# Patient Record
Sex: MALE | Race: WHITE | NOT HISPANIC OR LATINO | Employment: FULL TIME | URBAN - METROPOLITAN AREA
[De-identification: names, ages, dates, MRNs, and addresses within clinical notes are randomized per-mention and may not be internally consistent; named-entity substitution may affect disease eponyms.]

---

## 2023-05-25 ENCOUNTER — OFFICE VISIT (OUTPATIENT)
Dept: SURGERY | Facility: CLINIC | Age: 43
End: 2023-05-25

## 2023-05-25 ENCOUNTER — APPOINTMENT (OUTPATIENT)
Dept: LAB | Facility: CLINIC | Age: 43
End: 2023-05-25

## 2023-05-25 VITALS
TEMPERATURE: 97.8 F | BODY MASS INDEX: 38.28 KG/M2 | OXYGEN SATURATION: 99 % | SYSTOLIC BLOOD PRESSURE: 155 MMHG | WEIGHT: 267.4 LBS | DIASTOLIC BLOOD PRESSURE: 97 MMHG | RESPIRATION RATE: 12 BRPM | HEART RATE: 77 BPM | HEIGHT: 70 IN

## 2023-05-25 DIAGNOSIS — K42.9 UMBILICAL HERNIA WITHOUT OBSTRUCTION AND WITHOUT GANGRENE: ICD-10-CM

## 2023-05-25 DIAGNOSIS — K42.9 UMBILICAL HERNIA WITHOUT OBSTRUCTION OR GANGRENE: Primary | ICD-10-CM

## 2023-05-25 LAB
ALBUMIN SERPL BCP-MCNC: 3.8 G/DL (ref 3.5–5)
ALP SERPL-CCNC: 75 U/L (ref 46–116)
ALT SERPL W P-5'-P-CCNC: 56 U/L (ref 12–78)
ANION GAP SERPL CALCULATED.3IONS-SCNC: 2 MMOL/L (ref 4–13)
AST SERPL W P-5'-P-CCNC: 36 U/L (ref 5–45)
BASOPHILS # BLD AUTO: 0.08 THOUSANDS/ÂΜL (ref 0–0.1)
BASOPHILS NFR BLD AUTO: 1 % (ref 0–1)
BILIRUB SERPL-MCNC: 0.71 MG/DL (ref 0.2–1)
BUN SERPL-MCNC: 12 MG/DL (ref 5–25)
CALCIUM SERPL-MCNC: 9.2 MG/DL (ref 8.3–10.1)
CHLORIDE SERPL-SCNC: 105 MMOL/L (ref 96–108)
CO2 SERPL-SCNC: 26 MMOL/L (ref 21–32)
CREAT SERPL-MCNC: 1.12 MG/DL (ref 0.6–1.3)
EOSINOPHIL # BLD AUTO: 0.33 THOUSAND/ÂΜL (ref 0–0.61)
EOSINOPHIL NFR BLD AUTO: 4 % (ref 0–6)
ERYTHROCYTE [DISTWIDTH] IN BLOOD BY AUTOMATED COUNT: 11.6 % (ref 11.6–15.1)
GFR SERPL CREATININE-BSD FRML MDRD: 81 ML/MIN/1.73SQ M
GLUCOSE P FAST SERPL-MCNC: 102 MG/DL (ref 65–99)
HCT VFR BLD AUTO: 46.4 % (ref 36.5–49.3)
HGB BLD-MCNC: 15.2 G/DL (ref 12–17)
IMM GRANULOCYTES # BLD AUTO: 0.02 THOUSAND/UL (ref 0–0.2)
IMM GRANULOCYTES NFR BLD AUTO: 0 % (ref 0–2)
LYMPHOCYTES # BLD AUTO: 1.71 THOUSANDS/ÂΜL (ref 0.6–4.47)
LYMPHOCYTES NFR BLD AUTO: 19 % (ref 14–44)
MCH RBC QN AUTO: 31 PG (ref 26.8–34.3)
MCHC RBC AUTO-ENTMCNC: 32.8 G/DL (ref 31.4–37.4)
MCV RBC AUTO: 95 FL (ref 82–98)
MONOCYTES # BLD AUTO: 0.71 THOUSAND/ÂΜL (ref 0.17–1.22)
MONOCYTES NFR BLD AUTO: 8 % (ref 4–12)
NEUTROPHILS # BLD AUTO: 6.03 THOUSANDS/ÂΜL (ref 1.85–7.62)
NEUTS SEG NFR BLD AUTO: 68 % (ref 43–75)
NRBC BLD AUTO-RTO: 0 /100 WBCS
PLATELET # BLD AUTO: 279 THOUSANDS/UL (ref 149–390)
PMV BLD AUTO: 10.2 FL (ref 8.9–12.7)
POTASSIUM SERPL-SCNC: 3.9 MMOL/L (ref 3.5–5.3)
PROT SERPL-MCNC: 7.9 G/DL (ref 6.4–8.4)
RBC # BLD AUTO: 4.91 MILLION/UL (ref 3.88–5.62)
SODIUM SERPL-SCNC: 133 MMOL/L (ref 135–147)
WBC # BLD AUTO: 8.88 THOUSAND/UL (ref 4.31–10.16)

## 2023-05-25 NOTE — PROGRESS NOTES
Office Visit - General Surgery  Charlie Doherty MRN: [id number]  Encounter: [id number]    Assessment and Plan  Problem List Items Addressed This Visit        Other    Umbilical hernia without obstruction or gangrene - Primary   Patient with umbilical hernia which becomes intermittently incarcerated  Risk-benefit alternatives to surgery discussed at length with patient as well as spouse  Defect relatively small 1 to 2 cm in size  We will plan for operative intervention and repair  Plus minus mesh depending on final size of defect  Patient in agreement for mesh placement at this time  Patient also at high risk for recurrence given current smoking of cigars as well as obesity  Patient understands and accepts this as well  Chief Complaint:  Charlie Doherty is a 38 y o  male who presents for Hernia (Consult right inguinal hernia )    Subjective  Patient with known history of umbilical hernia  Has known for several years  Recently became obstructed and was reduced at the [facility]   Denies nausea vomiting fevers chills  Denies signs symptoms of obstruction including nausea and vomiting  Denies significant abdominal pain at this time notes only significant fullness when lifting  It is currently easily reducible per the patient  Patient without significant past medical history  Has history of sleep apnea  History reviewed  No pertinent past medical history  History reviewed  No pertinent surgical history  No prior surgeries    History reviewed  No pertinent family history  No family history of blood clots pulmonary embolism strokes    Medications  No current outpatient medications on file prior to visit  No current facility-administered medications on file prior to visit  Allergies  No Known Allergies    Review of Systems  All 11 systems reviewed and otherwise negative listed above in the HPI    Objective  Vitals:    05/25/23 0854   BP: 155/97   Pulse: 77   Resp: 12   Temp: 97 8 °F (36 6 °C)   SpO2: 99%       Physical Exam   General  NAD well appearing  HEENT  Pupils round equal to light accommodation no scleral icterus noted  Heart regular rate and rhythm no murmurs gallops rubs appreciated  Lungs clear to auscultation chest wall symmetric  Neuro GCS 15 alert oriented x3 full range of motion in all extremities  ABD: Obese umbilical hernia noted reducible  1 to 2 cm defect  Superior aspect of umbilicus  No guarding or rebound noted  No cellulitis     MSK/Skin:  No rashes erythema noted on scan

## 2023-06-06 ENCOUNTER — ANESTHESIA EVENT (OUTPATIENT)
Dept: PERIOP | Facility: HOSPITAL | Age: 43
End: 2023-06-06
Payer: OTHER MISCELLANEOUS

## 2023-06-07 ENCOUNTER — ANESTHESIA (OUTPATIENT)
Dept: PERIOP | Facility: HOSPITAL | Age: 43
End: 2023-06-07
Payer: OTHER MISCELLANEOUS

## 2023-06-07 ENCOUNTER — HOSPITAL ENCOUNTER (OUTPATIENT)
Facility: HOSPITAL | Age: 43
Setting detail: OUTPATIENT SURGERY
Discharge: HOME | End: 2023-06-07
Attending: SURGERY | Admitting: SURGERY
Payer: OTHER MISCELLANEOUS

## 2023-06-07 VITALS
WEIGHT: 266.76 LBS | RESPIRATION RATE: 16 BRPM | BODY MASS INDEX: 38.19 KG/M2 | TEMPERATURE: 97.2 F | HEART RATE: 74 BPM | HEIGHT: 70 IN | SYSTOLIC BLOOD PRESSURE: 142 MMHG | OXYGEN SATURATION: 94 % | DIASTOLIC BLOOD PRESSURE: 75 MMHG

## 2023-06-07 DIAGNOSIS — K42.9 UMBILICAL HERNIA WITHOUT OBSTRUCTION OR GANGRENE: Primary | ICD-10-CM

## 2023-06-07 PROCEDURE — NC001 PR NO CHARGE: Performed by: SURGERY

## 2023-06-07 PROCEDURE — 49593 RPR AA HRN 1ST 3-10 RDC: CPT | Performed by: SURGERY

## 2023-06-07 PROCEDURE — C1781 MESH (IMPLANTABLE): HCPCS | Performed by: SURGERY

## 2023-06-07 DEVICE — VENTRALEX ST HERNIA PATCH
Type: IMPLANTABLE DEVICE | Site: UMBILICAL | Status: FUNCTIONAL
Brand: VENTRALEX ST HERNIA PATCH

## 2023-06-07 RX ORDER — FENTANYL CITRATE/PF 50 MCG/ML
50 SYRINGE (ML) INJECTION
Status: DISCONTINUED | OUTPATIENT
Start: 2023-06-07 | End: 2023-06-07 | Stop reason: HOSPADM

## 2023-06-07 RX ORDER — ONDANSETRON 2 MG/ML
INJECTION INTRAMUSCULAR; INTRAVENOUS AS NEEDED
Status: DISCONTINUED | OUTPATIENT
Start: 2023-06-07 | End: 2023-06-07

## 2023-06-07 RX ORDER — PHENYLEPHRINE HCL IN 0.9% NACL 1 MG/10 ML
SYRINGE (ML) INTRAVENOUS AS NEEDED
Status: DISCONTINUED | OUTPATIENT
Start: 2023-06-07 | End: 2023-06-07

## 2023-06-07 RX ORDER — HYDROMORPHONE HCL/PF 1 MG/ML
0.5 SYRINGE (ML) INJECTION
Status: DISCONTINUED | OUTPATIENT
Start: 2023-06-07 | End: 2023-06-07 | Stop reason: HOSPADM

## 2023-06-07 RX ORDER — LIDOCAINE HYDROCHLORIDE 10 MG/ML
INJECTION, SOLUTION EPIDURAL; INFILTRATION; INTRACAUDAL; PERINEURAL AS NEEDED
Status: DISCONTINUED | OUTPATIENT
Start: 2023-06-07 | End: 2023-06-07

## 2023-06-07 RX ORDER — HYDROMORPHONE HCL IN WATER/PF 6 MG/30 ML
0.2 PATIENT CONTROLLED ANALGESIA SYRINGE INTRAVENOUS
Status: DISCONTINUED | OUTPATIENT
Start: 2023-06-07 | End: 2023-06-07 | Stop reason: HOSPADM

## 2023-06-07 RX ORDER — ROCURONIUM BROMIDE 10 MG/ML
INJECTION, SOLUTION INTRAVENOUS AS NEEDED
Status: DISCONTINUED | OUTPATIENT
Start: 2023-06-07 | End: 2023-06-07

## 2023-06-07 RX ORDER — DEXMEDETOMIDINE HYDROCHLORIDE 100 UG/ML
INJECTION, SOLUTION INTRAVENOUS AS NEEDED
Status: DISCONTINUED | OUTPATIENT
Start: 2023-06-07 | End: 2023-06-07

## 2023-06-07 RX ORDER — EPHEDRINE SULFATE 50 MG/ML
INJECTION INTRAVENOUS AS NEEDED
Status: DISCONTINUED | OUTPATIENT
Start: 2023-06-07 | End: 2023-06-07

## 2023-06-07 RX ORDER — MIDAZOLAM HYDROCHLORIDE 2 MG/2ML
INJECTION, SOLUTION INTRAMUSCULAR; INTRAVENOUS AS NEEDED
Status: DISCONTINUED | OUTPATIENT
Start: 2023-06-07 | End: 2023-06-07

## 2023-06-07 RX ORDER — OXYCODONE HYDROCHLORIDE 10 MG/1
10 TABLET ORAL EVERY 6 HOURS PRN
Status: DISCONTINUED | OUTPATIENT
Start: 2023-06-07 | End: 2023-06-07 | Stop reason: HOSPADM

## 2023-06-07 RX ORDER — DEXAMETHASONE SODIUM PHOSPHATE 10 MG/ML
INJECTION, SOLUTION INTRAMUSCULAR; INTRAVENOUS AS NEEDED
Status: DISCONTINUED | OUTPATIENT
Start: 2023-06-07 | End: 2023-06-07

## 2023-06-07 RX ORDER — METOCLOPRAMIDE HYDROCHLORIDE 5 MG/ML
10 INJECTION INTRAMUSCULAR; INTRAVENOUS ONCE AS NEEDED
Status: DISCONTINUED | OUTPATIENT
Start: 2023-06-07 | End: 2023-06-07 | Stop reason: HOSPADM

## 2023-06-07 RX ORDER — KETOROLAC TROMETHAMINE 30 MG/ML
INJECTION, SOLUTION INTRAMUSCULAR; INTRAVENOUS AS NEEDED
Status: DISCONTINUED | OUTPATIENT
Start: 2023-06-07 | End: 2023-06-07

## 2023-06-07 RX ORDER — ONDANSETRON 2 MG/ML
4 INJECTION INTRAMUSCULAR; INTRAVENOUS ONCE AS NEEDED
Status: DISCONTINUED | OUTPATIENT
Start: 2023-06-07 | End: 2023-06-07 | Stop reason: HOSPADM

## 2023-06-07 RX ORDER — ONDANSETRON 2 MG/ML
4 INJECTION INTRAMUSCULAR; INTRAVENOUS EVERY 6 HOURS PRN
Status: DISCONTINUED | OUTPATIENT
Start: 2023-06-07 | End: 2023-06-07 | Stop reason: HOSPADM

## 2023-06-07 RX ORDER — OXYCODONE HYDROCHLORIDE 5 MG/1
5 TABLET ORAL EVERY 6 HOURS PRN
Qty: 20 TABLET | Refills: 0 | Status: SHIPPED | OUTPATIENT
Start: 2023-06-07 | End: 2023-06-12

## 2023-06-07 RX ORDER — PROPOFOL 10 MG/ML
INJECTION, EMULSION INTRAVENOUS CONTINUOUS PRN
Status: DISCONTINUED | OUTPATIENT
Start: 2023-06-07 | End: 2023-06-07

## 2023-06-07 RX ORDER — FENTANYL CITRATE 50 UG/ML
INJECTION, SOLUTION INTRAMUSCULAR; INTRAVENOUS AS NEEDED
Status: DISCONTINUED | OUTPATIENT
Start: 2023-06-07 | End: 2023-06-07

## 2023-06-07 RX ORDER — CEFAZOLIN SODIUM 1 G/3ML
INJECTION, POWDER, FOR SOLUTION INTRAMUSCULAR; INTRAVENOUS AS NEEDED
Status: DISCONTINUED | OUTPATIENT
Start: 2023-06-07 | End: 2023-06-07

## 2023-06-07 RX ORDER — PROPOFOL 10 MG/ML
INJECTION, EMULSION INTRAVENOUS AS NEEDED
Status: DISCONTINUED | OUTPATIENT
Start: 2023-06-07 | End: 2023-06-07

## 2023-06-07 RX ORDER — BUPIVACAINE HYDROCHLORIDE 5 MG/ML
INJECTION, SOLUTION PERINEURAL AS NEEDED
Status: DISCONTINUED | OUTPATIENT
Start: 2023-06-07 | End: 2023-06-07 | Stop reason: HOSPADM

## 2023-06-07 RX ORDER — GLYCOPYRROLATE 0.2 MG/ML
INJECTION INTRAMUSCULAR; INTRAVENOUS AS NEEDED
Status: DISCONTINUED | OUTPATIENT
Start: 2023-06-07 | End: 2023-06-07

## 2023-06-07 RX ORDER — SODIUM CHLORIDE, SODIUM LACTATE, POTASSIUM CHLORIDE, CALCIUM CHLORIDE 600; 310; 30; 20 MG/100ML; MG/100ML; MG/100ML; MG/100ML
125 INJECTION, SOLUTION INTRAVENOUS CONTINUOUS
Status: DISCONTINUED | OUTPATIENT
Start: 2023-06-07 | End: 2023-06-07 | Stop reason: HOSPADM

## 2023-06-07 RX ORDER — ACETAMINOPHEN 325 MG/1
650 TABLET ORAL EVERY 4 HOURS PRN
Status: DISCONTINUED | OUTPATIENT
Start: 2023-06-07 | End: 2023-06-07 | Stop reason: HOSPADM

## 2023-06-07 RX ORDER — NEOSTIGMINE METHYLSULFATE 1 MG/ML
INJECTION INTRAVENOUS AS NEEDED
Status: DISCONTINUED | OUTPATIENT
Start: 2023-06-07 | End: 2023-06-07

## 2023-06-07 RX ORDER — LIDOCAINE HYDROCHLORIDE 10 MG/ML
0.5 INJECTION, SOLUTION EPIDURAL; INFILTRATION; INTRACAUDAL; PERINEURAL ONCE AS NEEDED
Status: DISCONTINUED | OUTPATIENT
Start: 2023-06-07 | End: 2023-06-07 | Stop reason: HOSPADM

## 2023-06-07 RX ORDER — DIPHENHYDRAMINE HYDROCHLORIDE 50 MG/ML
12.5 INJECTION INTRAMUSCULAR; INTRAVENOUS ONCE AS NEEDED
Status: DISCONTINUED | OUTPATIENT
Start: 2023-06-07 | End: 2023-06-07 | Stop reason: HOSPADM

## 2023-06-07 RX ORDER — OXYCODONE HYDROCHLORIDE 5 MG/1
5 TABLET ORAL EVERY 4 HOURS PRN
Status: DISCONTINUED | OUTPATIENT
Start: 2023-06-07 | End: 2023-06-07 | Stop reason: HOSPADM

## 2023-06-07 RX ADMIN — FENTANYL CITRATE 50 MCG: 50 INJECTION, SOLUTION INTRAMUSCULAR; INTRAVENOUS at 11:10

## 2023-06-07 RX ADMIN — FENTANYL CITRATE 50 MCG: 50 INJECTION, SOLUTION INTRAMUSCULAR; INTRAVENOUS at 10:53

## 2023-06-07 RX ADMIN — HYDROMORPHONE HYDROCHLORIDE 0.5 MG: 1 INJECTION, SOLUTION INTRAMUSCULAR; INTRAVENOUS; SUBCUTANEOUS at 11:27

## 2023-06-07 RX ADMIN — DEXMEDETOMIDINE HCL 8 MCG: 100 INJECTION INTRAVENOUS at 08:39

## 2023-06-07 RX ADMIN — PROPOFOL 300 MG: 10 INJECTION, EMULSION INTRAVENOUS at 08:43

## 2023-06-07 RX ADMIN — KETOROLAC TROMETHAMINE 30 MG: 30 INJECTION, SOLUTION INTRAMUSCULAR at 10:01

## 2023-06-07 RX ADMIN — FENTANYL CITRATE 50 MCG: 50 INJECTION, SOLUTION INTRAMUSCULAR; INTRAVENOUS at 09:32

## 2023-06-07 RX ADMIN — GLYCOPYRROLATE 0.6 MG: 0.2 INJECTION, SOLUTION INTRAMUSCULAR; INTRAVENOUS at 10:05

## 2023-06-07 RX ADMIN — EPHEDRINE SULFATE 5 MG: 50 INJECTION INTRAVENOUS at 09:09

## 2023-06-07 RX ADMIN — PROPOFOL 100 MG: 10 INJECTION, EMULSION INTRAVENOUS at 09:29

## 2023-06-07 RX ADMIN — SODIUM CHLORIDE, SODIUM LACTATE, POTASSIUM CHLORIDE, AND CALCIUM CHLORIDE: .6; .31; .03; .02 INJECTION, SOLUTION INTRAVENOUS at 08:35

## 2023-06-07 RX ADMIN — DEXMEDETOMIDINE HCL 8 MCG: 100 INJECTION INTRAVENOUS at 10:06

## 2023-06-07 RX ADMIN — EPHEDRINE SULFATE 10 MG: 50 INJECTION INTRAVENOUS at 10:04

## 2023-06-07 RX ADMIN — DEXMEDETOMIDINE HCL 4 MCG: 100 INJECTION INTRAVENOUS at 09:29

## 2023-06-07 RX ADMIN — ONDANSETRON 4 MG: 2 INJECTION INTRAMUSCULAR; INTRAVENOUS at 08:44

## 2023-06-07 RX ADMIN — Medication 50 MCG: at 09:45

## 2023-06-07 RX ADMIN — MIDAZOLAM 2 MG: 1 INJECTION INTRAMUSCULAR; INTRAVENOUS at 08:04

## 2023-06-07 RX ADMIN — LIDOCAINE HYDROCHLORIDE 50 MG: 10 INJECTION, SOLUTION EPIDURAL; INFILTRATION; INTRACAUDAL; PERINEURAL at 08:43

## 2023-06-07 RX ADMIN — Medication 50 MCG: at 09:09

## 2023-06-07 RX ADMIN — EPHEDRINE SULFATE 5 MG: 50 INJECTION INTRAVENOUS at 09:45

## 2023-06-07 RX ADMIN — Medication 100 MCG: at 09:07

## 2023-06-07 RX ADMIN — SODIUM CHLORIDE, SODIUM LACTATE, POTASSIUM CHLORIDE, AND CALCIUM CHLORIDE: .6; .31; .03; .02 INJECTION, SOLUTION INTRAVENOUS at 09:47

## 2023-06-07 RX ADMIN — DEXAMETHASONE SODIUM PHOSPHATE 10 MG: 10 INJECTION, SOLUTION INTRAMUSCULAR; INTRAVENOUS at 08:44

## 2023-06-07 RX ADMIN — PROPOFOL 120 MCG/KG/MIN: 10 INJECTION, EMULSION INTRAVENOUS at 10:03

## 2023-06-07 RX ADMIN — EPHEDRINE SULFATE 10 MG: 50 INJECTION INTRAVENOUS at 09:52

## 2023-06-07 RX ADMIN — FENTANYL CITRATE 100 MCG: 50 INJECTION, SOLUTION INTRAMUSCULAR; INTRAVENOUS at 08:37

## 2023-06-07 RX ADMIN — NEOSTIGMINE METHYLSULFATE 3 MG: 1 INJECTION INTRAVENOUS at 10:05

## 2023-06-07 RX ADMIN — DEXMEDETOMIDINE HCL 12 MCG: 100 INJECTION INTRAVENOUS at 08:30

## 2023-06-07 RX ADMIN — ROCURONIUM BROMIDE 50 MG: 10 INJECTION, SOLUTION INTRAVENOUS at 08:43

## 2023-06-07 RX ADMIN — EPHEDRINE SULFATE 5 MG: 50 INJECTION INTRAVENOUS at 09:21

## 2023-06-07 RX ADMIN — CEFAZOLIN 3000 MG: 1 INJECTION, POWDER, FOR SOLUTION INTRAMUSCULAR; INTRAVENOUS at 08:52

## 2023-06-07 NOTE — ANESTHESIA POSTPROCEDURE EVALUATION
Post-Op Assessment Note    CV Status:  Stable  Pain Score: 0    Pain management: adequate     Mental Status:  Awake and sleepy   PONV Controlled:  Controlled   Airway Patency:  Patent      Post Op Vitals Reviewed: Yes      Staff: CRNA         No notable events documented      /80 (06/07/23 1031)    Temp 98 °F (36 7 °C) (06/07/23 1031)    Pulse 74 (06/07/23 1031)   Resp 14 (06/07/23 1031)    SpO2 99 % (06/07/23 1031)

## 2023-06-07 NOTE — OP NOTE
OPERATIVE REPORT  PATIENT NAME: Juan Burgos    :  1980  MRN: [id number]  Pt Location: [facility] OR ROOM 03    SURGERY DATE: 2023    Surgeon(s) and Role:     * DO Simone Franco Primary    Preop Diagnosis:  Umbilical hernia without obstruction and without gangrene [K42 9]    Post-Op Diagnosis Codes:     * Umbilical hernia without obstruction and without gangrene [K42 9]    Procedure(s):  REPAIR HERNIA UMBILICAL WITH MESH INSERTION    Specimen(s):  * No specimens in log *    Estimated Blood Loss:   Minimal    Drains:  * No LDAs found *    Anesthesia Type:   General    Operative Indications:  Umbilical hernia without obstruction and without gangrene [K42 9]      Operative Findings:  Preperitoneal fat containing hernia with a 2 5 cm fascial defect  6 4 cm VentralX ST mesh placed    Complications:   None    Procedure and Technique:  The patient was brought to the operating room and identified verbally and via wristband  He was transferred to the operating room table and positioned supine  General endotracheal anesthesia was induced successfully  The patient's abdomen was prepped and draped in the usual sterile fashion  Pre-operative antibiotics were administered  A time out was performed confirming the patient, procedure, and site  All parties were in agreement  Attention was turned to the abdomen where a 3 cm curvilinear infra-umbilical incision was made with a 15 blade scalpel after adequate infiltration of local anesthetic into the skin and subcutaneous tissue  Dissection was deepened through the subcutaneous tissue with bovie electrocautery until a hernia sac was appreciated  The hernia sac was circumferentially dissected free of the umbilical stalk and surrounding fascia edges using a combination of Goodspring scissors and bovie electrocautery  The hernia sac was incised using bovie electrocautery  The hernia itself contained only pre-peritoneal fat   The hernia was reduced intra-abdominally and the underlying fascial edges were dissected bluntly with finger dissection, assuring at least 2 cm free edge circumferentially  The hernia defect measured 2 5 cm  A 6 4 cm VentralX ST mesh was placed in a pre-peritoneal underlay fashion, taking care to lay the mesh flat against the cleared fascial edges  The mesh was fastened to the fascia using 0 vicryl sutures in a figure of 8 fashion x 3, closing the fascia over the mesh  The umbilical stalk was sutured down to the level of the fascia to recreate the umbilicus  The wound was irrigated per layer and closed in 3 layers using 2-0 vicryl, 3-0 vicryl and 4-0 monocryl for the skin  Exofin skin glue was applied over the incision  The patient was then allowed to awaken, extubated, and transferred to the PACU having tolerated the procedure well  All instrument, needle, and sponge counts were correct at the end of the case  Radiofrequency detection was negative      Dr Elian Moctezuma was present for the entire procedure      Patient Disposition:  PACU         SIGNATURE: Yolis Sanchez DO  DATE: June 7, 2023  TIME: 10:18 AM

## 2023-06-07 NOTE — ANESTHESIA PREPROCEDURE EVALUATION
Procedure:  REPAIR HERNIA UMBILICAL POSSIBLE MESH (Abdomen)    Relevant Problems   ANESTHESIA (within normal limits)      CARDIO (within normal limits)      ENDO (within normal limits)      GI/HEPATIC (within normal limits)      /RENAL (within normal limits)      HEMATOLOGY (within normal limits)      MUSCULOSKELETAL (within normal limits)      NEURO/PSYCH (within normal limits)      PULMONARY (within normal limits)      Other   (+) Umbilical hernia without obstruction or gangrene        Physical Exam    Airway    Mallampati score: II  TM Distance: >3 FB  Neck ROM: full     Dental   No notable dental hx     Cardiovascular  Rhythm: regular, Rate: normal, Cardiovascular exam normal    Pulmonary  Pulmonary exam normal Breath sounds clear to auscultation,     Other Findings        Anesthesia Plan  ASA Score- 1     Anesthesia Type- general with ASA Monitors  Additional Monitors:   Airway Plan: ETT  Plan Factors-Exercise tolerance (METS): >4 METS  Chart reviewed  EKG reviewed  Imaging results reviewed  Existing labs reviewed  Patient summary reviewed  Induction- intravenous  Postoperative Plan- Plan for postoperative opioid use  Informed Consent- Anesthetic plan and risks discussed with patient  I personally reviewed this patient with the CRNA  Discussed and agreed on the Anesthesia Plan with the CRNA  Angeles Mims Recent labs personally reviewed:  Lab Results   Component Value Date    HGB 15 2 05/25/2023     05/25/2023    WBC 8 88 05/25/2023     Lab Results   Component Value Date    BUN 12 05/25/2023    CREATININE 1 12 05/25/2023    K 3 9 05/25/2023     I, Sharron Green MD, have personally seen and evaluated the patient prior to anesthetic care  I have reviewed the pre-anesthetic record, and other medical records if appropriate to the anesthetic care  If a CRNA is involved in the case, I have reviewed the CRNA assessment, if present, and agree   Risks/benefits and alternatives discussed with patient including possible PONV, sore throat, and possibility of rare anesthetic and surgical emergencies

## 2023-06-07 NOTE — H&P
H&P Exam - General Surgery   Juan Burgos 38 y o  male MRN: [id number]  Unit/Bed#: OR [place] Encounter: [id number]    Assessment and Plan      Problem List Items Addressed This Visit               Other     Umbilical hernia without obstruction or gangrene - Primary     Patient with umbilical hernia which becomes intermittently incarcerated  Risk-benefit alternatives to surgery discussed at length with patient as well as spouse  Defect relatively small 1 to 2 cm in size  We will plan for operative intervention and repair  Plus minus mesh depending on final size of defect  Patient in agreement for mesh placement at this time  Patient also at high risk for recurrence given current smoking of cigars as well as obesity  Patient understands and accepts this as well      Chief Complaint:  Juan Burgos is a 38 y o  male who presents for Hernia (Consult right inguinal hernia )     Subjective  Patient with known history of umbilical hernia  Has known for several years  Recently became obstructed and was reduced at the [facility]   Denies nausea vomiting fevers chills  Denies signs symptoms of obstruction including nausea and vomiting  Denies significant abdominal pain at this time notes only significant fullness when lifting  It is currently easily reducible per the patient      Patient without significant past medical history  Has history of sleep apnea  Medical History   History reviewed  No pertinent past medical history         Surgical History   History reviewed  No pertinent surgical history  No prior surgeries     Family History   History reviewed   No pertinent family history           No family history of blood clots pulmonary embolism strokes     Medications  No current outpatient medications on file prior to visit       No current facility-administered medications on file prior to visit          Allergies  No Known Allergies     Review of Systems  All 11 systems reviewed and otherwise negative listed above in the HPI  Objective      Vitals:     05/25/23 0854   BP: 155/97   Pulse: 77   Resp: 12   Temp: 97 8 °F (36 6 °C)   SpO2: 99%         Physical Exam   General  NAD well appearing  HEENT  Pupils round equal to light accommodation no scleral icterus noted  Heart regular rate and rhythm no murmurs gallops rubs appreciated  Lungs clear to auscultation chest wall symmetric  Neuro GCS 15 alert oriented x3 full range of motion in all extremities  ABD: Obese umbilical hernia noted reducible  1 to 2 cm defect  Superior aspect of umbilicus  No guarding or rebound noted  No cellulitis     MSK/Skin:  No rashes erythema noted on scan

## 2023-06-07 NOTE — DISCHARGE INSTR - AVS FIRST PAGE
Acute Care Surgery Discharge Instructions    Please follow-up as instructed  If you do not already have a follow-up appointment, please call the office when you leave to schedule an appointment to be seen in 2-3 weeks for post-operative re-evaluation  Activity:  - No lifting greater than 20 pounds or strenuous physical activity or exercise for 2 weeks  - Walking and normal light activities are encouraged  - Normal daily activities including climbing steps are okay  - No driving until no longer using pain medications  Return to work:    - You may return to work in 2 weeks or sooner if you are feeling well enough  Diet:    - You may resume your normal diet  Wound Care:  - May shower daily  No tub baths or swimming until cleared by your surgeon   - Wash incision gently with soap and water and pat dry  - Do not apply any creams or ointments unless instructed to do so by your surgeon   - Lynda Meeks may apply ice as needed (no longer than 20 minutes at a time) for the first 48 hours  - Bruising is not unusual and will go away with a little time  You may apply a warm, moist compress that may help the bruising resolve quicker  - You may remove the dressings the day after surgery (unless otherwise instructed)  Leave any skin tapes (steri-strips) on the incision(s) in place until they fall off on their own  Any new dressings are optional     Medications:    - You may resume all of your regular medications after discharge unless otherwise instructed  Please refer to your discharge medication list for further details  - Please take the pain medications as directed  - You are encouraged to use non-narcotic pain medications first (Tylenol and Ibuprofen) and whenever possible  Reserve the use of narcotic pain medication for moderate to severe pain not controlled by non-narcotic medications            - Please do not take your first dose of Ibuprofen until after 6 pm on 6/7/23          - Stagger Ibuprofen and Tylenol by 3 hours  - Mild pain bruising are expected  - No driving while taking narcotic pain medications  - You may become constipated, especially if taking pain medications  You may take any over the counter stool softeners or laxatives as needed  Examples: Milk of Magnesia, Colace, Senna  Additional Instructions:  - If you have any questions or concerns after discharge please call the office   - Call office or return to ER if fever greater than 101, chills, persistent nausea/vomiting, worsening/uncontrollable pain, and/or increasing redness or purulent/foul smelling drainage from incision(s)

## 2023-06-22 ENCOUNTER — OFFICE VISIT (OUTPATIENT)
Dept: SURGERY | Facility: CLINIC | Age: 43
End: 2023-06-22

## 2023-06-22 VITALS — BODY MASS INDEX: 39.14 KG/M2 | HEIGHT: 70 IN | TEMPERATURE: 97.3 F | WEIGHT: 273.4 LBS

## 2023-06-22 DIAGNOSIS — K42.9 UMBILICAL HERNIA WITHOUT OBSTRUCTION OR GANGRENE: Primary | ICD-10-CM

## 2023-06-22 PROCEDURE — 99024 POSTOP FOLLOW-UP VISIT: CPT | Performed by: SURGERY

## 2023-06-22 NOTE — PROGRESS NOTES
Office Visit - General Surgery  Isaura Lo MRN: [id number]  Encounter: [id number]    Assessment and Plan  Problem List Items Addressed This Visit        Other    Umbilical hernia without obstruction or gangrene - Primary     - doing well; no acute complaints  - slowly resume activities; rtw approx 6wks after OR            Chief Complaint:  Isaura Lo is a 38 y o  male who presents for Post-op (P/o umbilical hernia repair )    Subjective  No acute complaints    History reviewed  No pertinent past medical history  Past Surgical History:   Procedure Laterality Date   • [place] RPR AA HERNIA 1ST 3-10 CM REDUCIBLE N/A 6/7/2023    Procedure: REPAIR HERNIA UMBILICAL WITH MESH INSERTION;  Surgeon: Dale Haney DO;  Location: [facility];  Service: General       No family history on file  Medications  No current outpatient medications on file prior to visit  No current facility-administered medications on file prior to visit  Allergies  No Known Allergies    Review of Systems   All other systems reviewed and are negative  Objective  Vitals:    06/22/23 1104   Temp: (!) 97 3 °F (36 3 °C)       Physical Exam  Constitutional:       Appearance: Normal appearance  HENT:      Head: Atraumatic  Mouth/Throat:      Mouth: Mucous membranes are dry  Cardiovascular:      Rate and Rhythm: Normal rate  Pulses: Normal pulses  Pulmonary:      Effort: Pulmonary effort is normal    Abdominal:      General: Abdomen is flat  There is no distension  Palpations: Abdomen is soft  Tenderness: There is no abdominal tenderness  Musculoskeletal:      Cervical back: Neck supple  Skin:     General: Skin is warm and dry  Neurological:      General: No focal deficit present  Mental Status: He is alert and oriented to person, place, and time

## 2023-07-20 ENCOUNTER — OFFICE VISIT (OUTPATIENT)
Dept: SURGERY | Facility: CLINIC | Age: 43
End: 2023-07-20

## 2023-07-20 VITALS — TEMPERATURE: 97.2 F | BODY MASS INDEX: 39.11 KG/M2 | WEIGHT: 273.2 LBS | HEIGHT: 70 IN

## 2023-07-20 DIAGNOSIS — R10.33 PERIUMBILICAL ABDOMINAL PAIN: Primary | ICD-10-CM

## 2023-07-20 PROCEDURE — 99024 POSTOP FOLLOW-UP VISIT: CPT | Performed by: SURGERY

## 2023-07-20 NOTE — PROGRESS NOTES
Office Visit - General Surgery  Bailey Murillo MRN: [id number]  Encounter: [id number]    Assessment and Plan  Problem List Items Addressed This Visit    Patient s/p post-op umbilical hernia repair    Chief Complaint:  Bailey Murillo is a 38 y.o. male who presents for Post-op (2nd p/o umbilical hernia. )    Subjective  The patient presents for routine follow-up s/p umbilical hernia repair. He has been doing very well overall. His only complaint is the feeling of "pulling" associated with the hernia repair - He reports feeling "tension" in areas in proximity with the surgical site, including areas immediately surrounding the repair, and to some extent more remote anatomic regions such as the scrotum / testicles. History reviewed. No pertinent past medical history. Past Surgical History:   Procedure Laterality Date   • [place] RPR AA HERNIA 1ST 3-10 CM REDUCIBLE N/A 6/7/2023    Procedure: REPAIR HERNIA UMBILICAL WITH MESH INSERTION;  Surgeon: Mary Ann Mary DO;  Location: [facility];  Service: General     History reviewed. No pertinent family history. Social History     Tobacco Use   • Smoking status: Some Days     Types: Cigars   • Smokeless tobacco: Never   Substance Use Topics   • Alcohol use: Yes     Comment: socially   • Drug use: Never      Medications  No current outpatient medications on file prior to visit. No current facility-administered medications on file prior to visit. Allergies  No Known Allergies    Review of Systems   Constitutional: Positive for activity change. HENT: Negative. Respiratory: Negative. Cardiovascular: Negative. Gastrointestinal:        Abdominal discomfort associated with recent ventral umbilical hernia repair. Genitourinary:        Occasional scrotal pain associated with post-surgical "pulling" sensation. Musculoskeletal: Negative. Skin: Negative. Neurological: Negative. Psychiatric/Behavioral: Negative.       Objective  Vitals:    07/20/23 1647 Temp: (!) 97.2 °F (36.2 °C)     Physical Exam  Constitutional:       Appearance: Normal appearance. He is obese. HENT:      Head: Normocephalic. Nose: Nose normal.      Mouth/Throat:      Pharynx: Oropharynx is clear. Eyes:      Conjunctiva/sclera: Conjunctivae normal.   Cardiovascular:      Rate and Rhythm: Normal rate. Abdominal:      Palpations: Abdomen is soft. There is no mass. Hernia: No hernia is present. Comments: Appropriate post-op state of anterior abdomen; No evidence of cellulitis, recurrent hernia, or other surgical abnormality. Musculoskeletal:         General: Normal range of motion. Cervical back: Neck supple. Skin:     General: Skin is warm. Neurological:      General: No focal deficit present. Mental Status: He is alert. Mental status is at baseline. Psychiatric:         Mood and Affect: Mood normal.         Behavior: Behavior normal.       Assessment / Plan:  Routine post-op umbilical ventral hernia repair. Patient reports findings / complaints typical of early post-hernia surgery recovery, including the feeling of "internal pulling" and "stretching."  I reassured him regarding the above. He also wanted to know if he can go back to work as [employer] . He was instructed that it is generally safe to return to work, as long as he observes the following rules:  1) "If it hurts, stop"  2) "If it keeps hurting, call us"  3) No heavy lifting or strenuous activities; Also, remember that at some point he will "forget" about having had surgery and this may create a feeling of over-confidence, especially with lifting heavy objects. 4) Discuss with his supervisor a modified schedule or modified set of duties. 5) He can always call our office and we can always see him in our clinic. At this time, he can follow-up with our clinic on PRN basis. Arash Palmer  7/20/2023 at 17:00 p.m.

## 2025-07-06 ENCOUNTER — HOSPITAL ENCOUNTER (EMERGENCY)
Facility: HOSPITAL | Age: 45
Discharge: HOME | End: 2025-07-06
Attending: EMERGENCY MEDICINE
Payer: COMMERCIAL

## 2025-07-06 ENCOUNTER — APPOINTMENT (EMERGENCY)
Dept: RADIOLOGY | Facility: HOSPITAL | Age: 45
End: 2025-07-06
Payer: COMMERCIAL

## 2025-07-06 VITALS
HEART RATE: 87 BPM | OXYGEN SATURATION: 99 % | TEMPERATURE: 98.2 F | RESPIRATION RATE: 22 BRPM | SYSTOLIC BLOOD PRESSURE: 189 MMHG | DIASTOLIC BLOOD PRESSURE: 108 MMHG

## 2025-07-06 DIAGNOSIS — M54.40 CHRONIC MIDLINE LOW BACK PAIN WITH SCIATICA, SCIATICA LATERALITY UNSPECIFIED: Primary | ICD-10-CM

## 2025-07-06 DIAGNOSIS — G89.29 CHRONIC MIDLINE LOW BACK PAIN WITH SCIATICA, SCIATICA LATERALITY UNSPECIFIED: Primary | ICD-10-CM

## 2025-07-06 PROCEDURE — 96372 THER/PROPH/DIAG INJ SC/IM: CPT

## 2025-07-06 PROCEDURE — 99283 EMERGENCY DEPT VISIT LOW MDM: CPT

## 2025-07-06 PROCEDURE — 99284 EMERGENCY DEPT VISIT MOD MDM: CPT | Performed by: EMERGENCY MEDICINE

## 2025-07-06 PROCEDURE — 72100 X-RAY EXAM L-S SPINE 2/3 VWS: CPT

## 2025-07-06 RX ORDER — METHOCARBAMOL 500 MG/1
500 TABLET, FILM COATED ORAL 2 TIMES DAILY
Qty: 20 TABLET | Refills: 0 | Status: SHIPPED | OUTPATIENT
Start: 2025-07-06

## 2025-07-06 RX ORDER — LIDOCAINE 50 MG/G
1 PATCH TOPICAL ONCE
Status: DISCONTINUED | OUTPATIENT
Start: 2025-07-06 | End: 2025-07-06 | Stop reason: HOSPADM

## 2025-07-06 RX ORDER — KETOROLAC TROMETHAMINE 30 MG/ML
15 INJECTION, SOLUTION INTRAMUSCULAR; INTRAVENOUS ONCE
Status: COMPLETED | OUTPATIENT
Start: 2025-07-06 | End: 2025-07-06

## 2025-07-06 RX ORDER — METHOCARBAMOL 500 MG/1
500 TABLET, FILM COATED ORAL ONCE
Status: COMPLETED | OUTPATIENT
Start: 2025-07-06 | End: 2025-07-06

## 2025-07-06 RX ORDER — LIDOCAINE 50 MG/G
1 PATCH TOPICAL EVERY 24 HOURS
Qty: 15 PATCH | Refills: 0 | Status: SHIPPED | OUTPATIENT
Start: 2025-07-06

## 2025-07-06 RX ORDER — ACETAMINOPHEN 325 MG/1
975 TABLET ORAL ONCE
Status: COMPLETED | OUTPATIENT
Start: 2025-07-06 | End: 2025-07-06

## 2025-07-06 RX ADMIN — ACETAMINOPHEN 975 MG: 325 TABLET ORAL at 13:26

## 2025-07-06 RX ADMIN — KETOROLAC TROMETHAMINE 15 MG: 30 INJECTION, SOLUTION INTRAMUSCULAR; INTRAVENOUS at 13:27

## 2025-07-06 RX ADMIN — LIDOCAINE 1 PATCH: 700 PATCH TOPICAL at 13:27

## 2025-07-06 RX ADMIN — METHOCARBAMOL 500 MG: 500 TABLET ORAL at 13:26

## 2025-07-06 NOTE — ED ATTENDING ATTESTATION
"7/6/2025  I, Rolf Francisco DO, saw and evaluated the patient. I have discussed the patient with the resident/non-physician practitioner and agree with the resident's/non-physician practitioner's findings, Plan of Care, and MDM as documented in the resident's/non-physician practitioner's note, except where noted. All available labs and Radiology studies were reviewed.  I was present for key portions of any procedure(s) performed by the resident/non-physician practitioner and I was immediately available to provide assistance.       At this point I agree with the current assessment done in the Emergency Department.  I have conducted an independent evaluation of this patient a history and physical is as follows:    Patient is a 44-year-old male who presents with low back pain.  Patient states that he noticed mild lower back pain on Sunday, 6/29/2025.  He called off of work on Monday due to left ankle pain.  He went to work (works at [employer]) on Tuesday.  He bent down to  a package and had a sharp pain in his lower back.  He states that it is midline and occasionally radiates into his thighs.  He has a baseline pressure in his lower back, which he describes as \"someone punching him in the back\".  He then has a sharp shooting pain with certain movements.  He states that he went home from work and was unable to ambulate for approximately 3 days.  He now is able to ambulate but very slowly.  He has been using IcyHot and applying a Lidoderm patch.  He denies numbness, tingling, weakness, bowel or bladder dysfunction, other concerns.    On exam, patient is in no acute distress as he is sitting on the edge of the stretcher.  Heart is regular rate and rhythm.  Breath sounds normal.  No tenderness to palpation in the lumbar region, neither midline or paraspinal musculature.  Motor, sensation normal.  Reflexes normal.    Given history and physical, suspect musculoskeletal etiology. Neurovascularly intact. Do not suspect " "AAA, aortic dissection, renal pathology, perforated viscus. Low suspicion for cauda equina or epidural compression syndrome as there is no bowel or bladder dysfunction, bilateral symptoms or saddle paresthesias. No red flag features such as trauma, fever, immunocompromised state, history of malignancy. Do not feel that emergent imaging indicated at this time. Patient advised to follow up with PCP and return to ED if symptoms progress.      Portions of the above record have been created with voice recognition software.  Occasional wrong word or \"sound alike\" substitutions may have occurred due to the inherent limitations of voice recognition software.  Read the chart carefully and recognize, using context, where substitutions may have occurred.      ED Course         Critical Care Time  Procedures      "

## 2025-07-06 NOTE — ED PROVIDER NOTES
Time reflects when diagnosis was documented in both MDM as applicable and the Disposition within this note       Time User Action Codes Description Comment    7/6/2025  2:24 PM Dwight Brandon John [M54.40,  G89.29] Chronic midline low back pain with sciatica, sciatica laterality unspecified           ED Disposition       ED Disposition   Discharge    Condition   Stable    Date/Time   Sun Jul 6, 2025  2:28 PM    Comment   Arturo Salgado discharge to home/self care.                   Assessment & Plan       Medical Decision Making  Arturo Salgado is a 44 y.o. male with a past medical history of herniated disks being evaluated for back pain.    Differential diagnoses include but not limited to: Disc herniation, lumbar radiculopathy, fracture, sprain/sprain    Initial workup to include: X-ray of lumbar spine, symptomatic treatment with Toradol, Tylenol, Robaxin, lidocaine patch    See ED Course for data/imaging interpretation and further MDM.    Disposition: X-ray of lumbar back without any osseous abnormalities, patient reports improvement with symptomatic treatment.  Will be given referral for comprehensive spine follow-up in addition to prescription for Robaxin and lidocaine.  Patient counseled on sedation effect of muscle relaxers instructed to only use close to bedtime or when not operating cars or heavy machinery.  Patient amenable to plan, discharged home in stable condition.    Amount and/or Complexity of Data Reviewed  Radiology: ordered and independent interpretation performed. Decision-making details documented in ED Course.    Risk  OTC drugs.  Prescription drug management.        ED Course as of 07/06/25 1655   Sun Jul 06, 2025   1358 XR spine lumbar 2 or 3 views injury  No acute osseous abnormalities noted per my interpretation     1427 XR spine lumbar 2 or 3 views injury  IMPRESSION:     No acute osseous abnormality.     Degenerative changes as described.        Computerized Assisted Algorithm (CAA) may have  been used to analyze all applicable images.        Workstation performed: [id number]         Medications   acetaminophen (TYLENOL) tablet 975 mg (975 mg Oral Given 7/6/25 1326)   ketorolac (TORADOL) injection 15 mg (15 mg Intramuscular Given 7/6/25 1327)   methocarbamol (ROBAXIN) tablet 500 mg (500 mg Oral Given 7/6/25 1326)       ED Risk Strat Scores                    No data recorded        SBIRT 20yo+      Flowsheet Row Most Recent Value   Initial Alcohol Screen: US AUDIT-C     1. How often do you have a drink containing alcohol? 0 Filed at: 07/06/2025 1332   2. How many drinks containing alcohol do you have on a typical day you are drinking?  0 Filed at: 07/06/2025 1332   3a. Male UNDER 65: How often do you have five or more drinks on one occasion? 0 Filed at: 07/06/2025 1332   Audit-C Score 0 Filed at: 07/06/2025 1332   NADEEN: How many times in the past year have you...    Used an illegal drug or used a prescription medication for non-medical reasons? Never Filed at: 07/06/2025 1332                            History of Present Illness       Chief Complaint   Patient presents with    Back Pain     Patient has has back spasms since Tuesday night. Has not been able to get off the floor for the most part since then. On Friday morning was able to get off the floor to get into bed. Patient has hx of 2 herniated disks and a ruptured disk         Past Medical History[1]   Past Surgical History[2]   Family History[3]   Social History[4]   E-Cigarette/Vaping      E-Cigarette/Vaping Substances      I have reviewed and agree with the history as documented.     Arturo Salgado is a 44 y.o. male with a past medical history of herniated disks being evaluated for back pain.  Patient states that he has had back spasms approximately 5 days ago which were so painful he fell to the floor and had difficulty getting up.  He has had intermittent lumbar back pain since this episode.  He also reports fall while walking dog onto grass  yesterday.  Otherwise denies any significant traumatic injuries or falls on back.  No prior history of IV drug use, bowel or bladder incontinence, malignancy.  States that he has never required any back surgeries in the past.            Back Pain      Review of Systems   Musculoskeletal:  Positive for back pain (Lumbar).   All other systems reviewed and are negative.          Objective       ED Triage Vitals   Temperature Pulse Blood Pressure Respirations SpO2 Patient Position - Orthostatic VS   07/06/25 1121 07/06/25 1119 07/06/25 1119 07/06/25 1119 07/06/25 1119 07/06/25 1119   98.2 °F (36.8 °C) 87 (!) 189/108 22 99 % Sitting      Temp Source Heart Rate Source BP Location FiO2 (%) Pain Score    07/06/25 1119 07/06/25 1119 07/06/25 1119 -- 07/06/25 1119    Oral Monitor Right arm  10 - Worst Possible Pain      Vitals      Date and Time Temp Pulse SpO2 Resp BP Pain Score FACES Pain Rating User   07/06/25 1326 -- -- -- -- -- 9 -- AB   07/06/25 1121 98.2 °F (36.8 °C) -- -- -- -- -- -- DP   07/06/25 1119 -- 87 99 % 22 189/108 10 - Worst Possible Pain -- DP            Physical Exam  Vitals and nursing note reviewed.   Constitutional:       General: He is not in acute distress.     Appearance: He is well-developed.   HENT:      Head: Normocephalic and atraumatic.     Eyes:      Conjunctiva/sclera: Conjunctivae normal.       Cardiovascular:      Rate and Rhythm: Normal rate and regular rhythm.      Heart sounds: No murmur heard.  Pulmonary:      Effort: Pulmonary effort is normal. No respiratory distress.      Breath sounds: Normal breath sounds.   Abdominal:      Palpations: Abdomen is soft.      Tenderness: There is no abdominal tenderness.     Musculoskeletal:         General: No swelling.      Cervical back: Normal and neck supple. No bony tenderness.      Thoracic back: Normal. No bony tenderness.      Lumbar back: Tenderness and bony tenderness present.      Comments: Lumbar back pain with radiculopathy to lower  extremities     Skin:     General: Skin is warm and dry.      Capillary Refill: Capillary refill takes less than 2 seconds.     Neurological:      Mental Status: He is alert.      GCS: GCS eye subscore is 4. GCS verbal subscore is 5. GCS motor subscore is 6.      Cranial Nerves: Cranial nerves 2-12 are intact. No cranial nerve deficit.      Sensory: Sensation is intact. No sensory deficit.      Motor: Motor function is intact. No weakness.     Psychiatric:         Mood and Affect: Mood normal.         Results Reviewed       None            XR spine lumbar 2 or 3 views injury   ED Interpretation by Dwight Brandon DO (07/06 7382)   No acute osseous abnormalities noted per my interpretation      Final Interpretation by Justin Santos MD (07/06 7106)      No acute osseous abnormality.      Degenerative changes as described.         Computerized Assisted Algorithm (CAA) may have been used to analyze all applicable images.         Workstation performed: [id number]             Procedures    ED Medication and Procedure Management   None     Discharge Medication List as of 7/6/2025  2:28 PM        START taking these medications    Details   lidocaine (LIDODERM) 5 % Apply 1 patch topically every 24 hours over 12 hours Remove & Discard patch within 12 hours or as directed by MD, Starting Sun 7/6/2025, Normal      methocarbamol (ROBAXIN) 500 mg tablet Take 1 tablet (500 mg total) by mouth 2 (two) times a day, Starting Sun 7/6/2025, Normal             ED SEPSIS DOCUMENTATION   Time reflects when diagnosis was documented in both MDM as applicable and the Disposition within this note       Time User Action Codes Description Comment    7/6/2025  2:24 PM Dwight Brandon Add [M54.40,  G89.29] Chronic midline low back pain with sciatica, sciatica laterality unspecified                    [1] No past medical history on file.  [2]   Past Surgical History:  Procedure Laterality Date    CT RPR AA HERNIA 1ST 3-10 CM REDUCIBLE N/A 6/7/2023     Procedure: REPAIR HERNIA UMBILICAL WITH MESH INSERTION;  Surgeon: Benjamin Nunez DO;  Location: [facility];  Service: General   [3] No family history on file.  [4]   Social History  Tobacco Use    Smoking status: Some Days     Types: Cigars    Smokeless tobacco: Never   Substance Use Topics    Alcohol use: Yes     Comment: socially    Drug use: Never        Dwight Brandon DO  07/06/25 [id number]

## 2025-07-06 NOTE — DISCHARGE INSTRUCTIONS
"Patient Education     Herniated disc   The Basics   Written by the doctors and editors at [facility]   What is a herniated disc? -- A herniated disc is a condition that affects the back. It can cause pain, numbness, or tingling down 1 or both legs.  To understand what a herniated disc is and how it causes symptoms, it's helpful to first learn a little about the back and spine.  The back is made up of (figure 1):   Vertebrae - These are the bones of the spine. Each has a hole in the center. The vertebrae are stacked to form a hollow tube called the \"spinal canal.\" The spinal cord passes through this tube and is protected by the vertebrae.   Spinal cord and nerves - The spinal cord is the bundle of nerves that connects the brain to the rest of the body. It runs through the vertebrae. Nerves branch from the spinal cord and pass in between the vertebrae. From there, they connect to the arms, the legs, and the organs.   Muscles, tendons, and ligaments - These support the vertebrae. They are used to move the head and neck, stand upright, and bend and flex the body. They are also called the \"soft tissues\" of the neck and back.   Discs - Rubbery discs sit in between each of the vertebrae. These add cushion and allow movement.  The discs have a tough outer shell and jelly-like center. The outer shell of the discs can sometimes break open, spilling the jelly material inside. This is what doctors call a herniated disc.  Herniated discs can cause symptoms, because the jelly material that spills out of them can irritate nearby nerves. Plus, the disc itself can bulge and press on nerves (figure 2).  This article discusses herniated discs only in the lower back. Other parts of the back can have herniated discs, but that is less common.  What are the symptoms of a herniated disc in the lower back? -- Herniated discs do not always cause symptoms. When they do, the most common symptoms are tingling, pain, or numbness that spreads down " "1 leg. These symptoms affect different parts of the leg, depending on which disc in the lower back is herniated.  Will I need tests? -- Probably not. There are tests that can check for a herniated disc, but most people do not need them. That's because most people who have symptoms of a herniated disc get better on their own, regardless of what the tests show.  Imaging tests, such as an MRI or a CT scan, can show what the tissues inside the back look like. These tests can find a herniated disc if you have one. But doctors do not usually order this test until you have had symptoms for at least 4 to 6 weeks. In most cases, it does not make sense to order the test sooner, because the treatment for herniated disc during those first few weeks is the same no matter what the tests might show.  Should I see a doctor or nurse? -- See your doctor or nurse right away if you have:   New back pain that goes into the groin or down the leg   Back or leg pain along with leg weakness or problems controlling your bowels or bladder   A problem called \"foot drop,\" which is when you cannot seem to hold your foot up. You might notice this especially while walking.   Back or leg pain along with a fever or other symptoms that worry you  How is herniated disc treated? -- A small number of people need surgery to treat a herniated disc. But most people do well with simpler treatments, such as:   Pain medicines that you can get without a prescription   Medicines to relax the muscles (called muscle relaxants)   Injections of medicines that numb the back or reduce swelling   Physical therapy to teach you special exercises and stretches   Spinal manipulation, which is when someone like a physical therapist or a chiropractor moves or \"adjusts\" the joints of your back   Acupuncture, which is when someone who knows traditional Chinese medicine inserts tiny needles into your body to block pain signals   Massage  How do I know if surgery is right for " me? -- Your doctor will tell you if surgery could help you. Then, you can decide together if surgery is right for you.  Surgery to treat a herniated disc usually involves removing the part of the disc that is damaged. Sometimes, the entire disc is removed. It could help you feel better faster than you would without surgery. But, like any surgery, it also comes with risks.  Although surgery can speed up recovery, it is not usually necessary for a herniated disc. That's because, over time, the body absorbs the jelly from a damaged disc and heals on its own. But this can take months.  People who have a lot of discomfort or who want to get better fast sometimes choose surgery. People who are afraid of surgery or who feel like they can cope with their symptoms sometimes decide against surgery.  If your doctor suggests surgery to treat your herniated disc, ask:   How likely is it that surgery will help or completely fix my symptoms?   How quickly will I recover from surgery?   What are the risks of surgery?   What happens if I do not have surgery?  Is there anything I can do on my own to feel better? -- Yes. One of the most important things you can do to feel better is to stay as active as possible. Even if you have some pain or discomfort, you should not stay in bed or rest too long.  People used to think that bedrest was the best thing for an injured back. The truth is, bedrest can actually make back problems worse. That's because the back can get weak and stiff with too much rest.  Do not worry that you will do yourself harm by being active. It might hurt a bit more when you move around, but activity will do you good.  Can herniated discs be prevented? -- Probably not. People sometimes think that they got a herniated disc because they lifted something the wrong way or strained their back somehow. The truth is, there is not much proof that moving or lifting the wrong way can cause a herniated disc.  All topics are  updated as new evidence becomes available and our peer review process is complete.  This topic retrieved from [employer] on: May 10, 2024.  Topic 20039 Version 21.0  Release: 32.4.3 - C32.129  © 2024 UpToDate, Inc. and/or its affiliates. All rights reserved.  figure 1: Anatomy of the back     This drawing shows the different parts of the back. Back pain can be caused by problems with the muscles, ligaments, discs, bones (vertebrae), or nerves.  Graphic 79111 Version 6.0  figure 2: Bulging disc     Between the vertebrae are discs that cushion and protect the bones. Over time, wear and tear can cause 1 or more discs to bulge. This can cause pain if the bulging disc presses on a nerve.  Graphic 42861 Version 5.0  Consumer Information Use and Disclaimer   Disclaimer: This generalized information is a limited summary of diagnosis, treatment, and/or medication information. It is not meant to be comprehensive and should be used as a tool to help the user understand and/or assess potential diagnostic and treatment options. It does NOT include all information about conditions, treatments, medications, side effects, or risks that may apply to a specific patient. It is not intended to be medical advice or a substitute for the medical advice, diagnosis, or treatment of a health care provider based on the health care provider's examination and assessment of a patient's specific and unique circumstances. Patients must speak with a health care provider for complete information about their health, medical questions, and treatment options, including any risks or benefits regarding use of medications. This information does not endorse any treatments or medications as safe, effective, or approved for treating a specific patient. Up[employer]Date, Inc. and its affiliates disclaim any warranty or liability relating to this information or the use thereof.The use of this information is governed by the Terms of Use, available at  https://www.woltersCardioVIPuwer.com/en/know/clinical-effectiveness-terms. 2024© [employer] and its affiliates and/or licensors. All rights reserved.  Copyright   © 2024 [employer] and/or its affiliates. All rights reserved.

## 2025-07-08 ENCOUNTER — TELEPHONE (OUTPATIENT)
Dept: PHYSICAL THERAPY | Facility: OTHER | Age: 45
End: 2025-07-08

## 2025-07-08 NOTE — TELEPHONE ENCOUNTER
Call placed to the patient per [facility] referral.    V/M left for patient to call back. Phone number and hours of business provided.    This is the 1st attempt to reach the patient. Will defer referral per protocol.    Low back pain/spasm.  Hx of herniated discs.

## 2025-07-16 ENCOUNTER — NURSE TRIAGE (OUTPATIENT)
Dept: PHYSICAL THERAPY | Facility: OTHER | Age: 45
End: 2025-07-16

## 2025-07-16 DIAGNOSIS — G89.29 ACUTE EXACERBATION OF CHRONIC LOW BACK PAIN: Primary | ICD-10-CM

## 2025-07-16 DIAGNOSIS — M54.50 ACUTE EXACERBATION OF CHRONIC LOW BACK PAIN: Primary | ICD-10-CM

## 2025-07-16 NOTE — TELEPHONE ENCOUNTER
Called the patient to complete the triage process started today @ 1:20 pm.    Wife states he is unable to come to the phone at this time and will calls us back.    I provided our contact information.    Referral Closed.  Triage will be completed when a call back is received.

## 2025-07-16 NOTE — TELEPHONE ENCOUNTER
Red Flag and Start back documentation is currently located under Additional Charting.    [facility] was reviewed in detail and what we can provide for their back pain.  Patient is agreeable to being triaged and would like to proceed with Physical Therapy.    Referral was placed for Physical Therapy at the [facility] site. Patients information was sent to the  to make evaluation appointment. Patient made aware that the PT office  will be calling to schedule the appointment.  Patient was provided with the phone number to the PT office.    No further questions and/or concerns were voiced by the patient at this time. Patient states understanding of the referral that was placed.

## 2025-07-16 NOTE — TELEPHONE ENCOUNTER
Background - Initial Assessment  Clinical complaint: ED visit on 07/06 due to B/L Lower Back Pain that began 07/01. It radiates near the hips and down both legs. No pain in his upper body. Hx of herniated.ruptured discs. Numbness and tingling sensation in both legs. No recent fall, car accident , work injury or direct trauma to his spine. Not seeing a spine doctor/specialist for this pain. Patient states pain is constant. Worse with certain positions /movement. Patient described pain as sharp, electric shock, tightness, stiffness and pressure. He takes medication as needed .  Date of onset: Tuesday 07/01 ( new flare up)  Frequency of pain: constant  Quality of pain: sharp, electric shock, tight, stiff, pressure.    Protocols used: [facility] Protocol

## 2025-07-24 ENCOUNTER — EVALUATION (OUTPATIENT)
Dept: PHYSICAL THERAPY | Facility: CLINIC | Age: 45
End: 2025-07-24
Attending: PHYSICAL THERAPIST
Payer: COMMERCIAL

## 2025-07-24 DIAGNOSIS — M54.50 ACUTE EXACERBATION OF CHRONIC LOW BACK PAIN: Primary | ICD-10-CM

## 2025-07-24 DIAGNOSIS — G89.29 ACUTE EXACERBATION OF CHRONIC LOW BACK PAIN: Primary | ICD-10-CM

## 2025-07-24 PROCEDURE — 97162 PT EVAL MOD COMPLEX 30 MIN: CPT | Performed by: PHYSICAL THERAPIST

## 2025-07-24 NOTE — PROGRESS NOTES
PT Evaluation     Today's date: 2025  Patient name: Arturo Salgado  : 1980  MRN: [id number]  Referring provider: Ramon Raymundo PT  Dx:   Encounter Diagnosis     ICD-10-CM    1. Acute exacerbation of chronic low back pain  M54.50     G89.29                      Assessment  Impairments: abnormal or restricted ROM, abnormal movement, activity intolerance, impaired physical strength, lacks appropriate home exercise program, pain with function, poor posture , poor body mechanics, participation limitations and activity limitations    Assessment details: Pt presents with s/s consistent with acute-on-chronic lumbar derangement with a response to flexion. He will benefit from skilled PT services to address his impairments in order to decrease pain and restore function.  Understanding of Dx/Px/POC: good     Prognosis: good    Goals  STG - 4 wks  1) pt will normalize lumbar ROM  2) pt will improve pain 25%    LTG - 8 wks  1) pt will demonstrate ADILENE NE,NE  2) pt will improve pain 50%    Plan  Patient would benefit from: skilled physical therapy  Planned modality interventions: low level laser therapy    Planned therapy interventions: joint mobilization, manual therapy, abdominal trunk stabilization, body mechanics training, neuromuscular re-education, postural training, self care, strengthening, stretching, therapeutic activities, therapeutic exercise, flexibility, functional ROM exercises, gait training, home exercise program, graded activity and graded exercise    Frequency: 1x week  Duration in weeks: 8  Treatment plan discussed with: patient        Subjective Evaluation    History of Present Illness  Mechanism of injury: Pt is a 44 y.o. male with PMHx significant for umbilical hernia repair '. He reports a 20-yr Hx of LBP with an acute exacerbation 3 wks ago upon waking that has since returned to baseline. He denies red flags. He is looking to address his CLBP at baseline as well as prevent future  exacerbations.    Patient Goals  Patient goals for therapy: decreased pain  Patient goal: prevent future exacerbations  Pain  Current pain ratin  At best pain ratin  At worst pain ratin  Location: B LBP  Quality: pressure, dull ache and discomfort  Relieving factors: change in position  Aggravating factors: lifting      Diagnostic Tests  X-ray: abnormal    FCE comments: Slight dextroscoliosis of lumbar spine. No listhesis.     Mild degenerative changes consist of tiny endplate osteophytes, disc calcification, and mild disc height loss.Treatments  No previous or current treatments        Objective     Concurrent Complaints  Negative for night pain, disturbed sleep, bladder dysfunction, bowel dysfunction, saddle (S4) numbness, history of cancer, history of trauma and infection    Postural Observations  Seated posture: poor  Standing posture: poor  Correction of posture: has no consistent effect      Neurological Testing     Sensation     Lumbar   Left   Intact: light touch    Right   Intact: light touch    Comments   Left light touch: L1-S1  Right light touch: L1-S1    Active Range of Motion     Lumbar   Flexion:  Restriction level: minimal  Extension:  Restriction level: moderate  Left lateral flexion:  Restriction level: minimal  Right lateral flexion:  Restriction level: minimal  Mechanical Assessment    Cervical      Thoracic      Lumbar    Sitting flexion: repeated movements  D,B (normalized ROM)  Standing extension: repeated movements  P,NW  Lying extension: repeated movements  neutral P,NW; hips offset R P,W    Strength/Myotome Testing     Left Hip   Planes of Motion   Flexion: 4    Right Hip   Planes of Motion   Flexion: 4    Left Knee   Flexion: 5  Extension: 5    Right Knee   Flexion: 5  Extension: 5    Left Ankle/Foot   Dorsiflexion: 5  Plantar flexion: 5  Great toe extension: 5    Right Ankle/Foot   Dorsiflexion: 5  Plantar flexion: 5  Great toe extension: 5             Precautions: PMHx:  umbilical hernia repair '22  Dx: acute-on-chronic lumbar derangement responding to flexion    Manuals 7/24            Laser L/S                                                    Neuro Re-Ed             Postural correction and awareness training 5 min            RFISit 3x10            RFIL             Supine PPT             Supine PPT dying bugs             PPT partial crunches                          Ther Ex                                                                                                                     Ther Activity             STS             Lifting mechanics                          Gait Training                                       Modalities

## 2025-07-28 ENCOUNTER — OFFICE VISIT (OUTPATIENT)
Dept: PHYSICAL THERAPY | Facility: CLINIC | Age: 45
End: 2025-07-28
Attending: PHYSICAL THERAPIST
Payer: COMMERCIAL

## 2025-07-28 DIAGNOSIS — G89.29 ACUTE EXACERBATION OF CHRONIC LOW BACK PAIN: Primary | ICD-10-CM

## 2025-07-28 DIAGNOSIS — M54.50 ACUTE EXACERBATION OF CHRONIC LOW BACK PAIN: Primary | ICD-10-CM

## 2025-07-28 PROCEDURE — 97112 NEUROMUSCULAR REEDUCATION: CPT | Performed by: PHYSICAL THERAPIST

## 2025-08-04 ENCOUNTER — OFFICE VISIT (OUTPATIENT)
Dept: PHYSICAL THERAPY | Facility: CLINIC | Age: 45
End: 2025-08-04
Attending: PHYSICAL THERAPIST
Payer: COMMERCIAL

## 2025-08-04 DIAGNOSIS — G89.29 ACUTE EXACERBATION OF CHRONIC LOW BACK PAIN: Primary | ICD-10-CM

## 2025-08-04 DIAGNOSIS — M54.50 ACUTE EXACERBATION OF CHRONIC LOW BACK PAIN: Primary | ICD-10-CM

## 2025-08-04 PROCEDURE — 97112 NEUROMUSCULAR REEDUCATION: CPT | Performed by: PHYSICAL THERAPIST

## 2025-08-11 ENCOUNTER — OFFICE VISIT (OUTPATIENT)
Dept: PHYSICAL THERAPY | Facility: CLINIC | Age: 45
End: 2025-08-11
Attending: PHYSICAL THERAPIST
Payer: COMMERCIAL

## (undated) DEVICE — BETHLEHEM UNIVERSAL MINOR GEN: Brand: CARDINAL HEALTH

## (undated) DEVICE — VENTRALEX ST HERNIA PATCH
Type: IMPLANTABLE DEVICE | Status: NON-FUNCTIONAL
Brand: VENTRALEX ST HERNIA PATCH

## (undated) DEVICE — CHLORAPREP HI-LITE 26ML ORANGE

## (undated) DEVICE — ADHESIVE SKIN HIGH VISCOSITY EXOFIN 1ML

## (undated) DEVICE — NEEDLE 25G X 1 1/2

## (undated) DEVICE — DRAPE LAPAROTOMY W/POUCHES

## (undated) DEVICE — ASTOUND STANDARD SURGICAL GOWN, XXL: Brand: CONVERTORS

## (undated) DEVICE — SUT VICRYL 2-0 REEL 54 IN J286G

## (undated) DEVICE — SUT MONOCRYL 4-0 PS-2 18 IN Y496G

## (undated) DEVICE — 3000CC GUARDIAN II: Brand: GUARDIAN

## (undated) DEVICE — GLOVE SRG BIOGEL ECLIPSE 8.5

## (undated) DEVICE — ROSEBUD DISSECTORS: Brand: DEROYAL

## (undated) DEVICE — SUT VICRYL 3-0 SH 27 IN J416H

## (undated) DEVICE — ANTIBACTERIAL UNDYED BRAIDED (POLYGLACTIN 910), SYNTHETIC ABSORBABLE SUTURE: Brand: COATED VICRYL

## (undated) DEVICE — INTENDED FOR TISSUE SEPARATION, AND OTHER PROCEDURES THAT REQUIRE A SHARP SURGICAL BLADE TO PUNCTURE OR CUT.: Brand: BARD-PARKER SAFETY BLADES SIZE 15, STERILE

## (undated) DEVICE — PLUMEPEN PRO 10FT